# Patient Record
Sex: FEMALE | Race: WHITE | NOT HISPANIC OR LATINO | Employment: OTHER | URBAN - METROPOLITAN AREA
[De-identification: names, ages, dates, MRNs, and addresses within clinical notes are randomized per-mention and may not be internally consistent; named-entity substitution may affect disease eponyms.]

---

## 2017-02-02 ENCOUNTER — TELEPHONE (OUTPATIENT)
Dept: ENDOCRINOLOGY | Facility: CLINIC | Age: 62
End: 2017-02-02

## 2017-02-02 NOTE — TELEPHONE ENCOUNTER
----- Message from Isabella Collins sent at 2/2/2017  3:38 PM CST -----  Contact: Self [phone]  pls contact pt with lab results from December. Pt can reached at [phone].

## 2017-02-09 ENCOUNTER — TELEPHONE (OUTPATIENT)
Dept: ENDOCRINOLOGY | Facility: CLINIC | Age: 62
End: 2017-02-09

## 2017-02-09 NOTE — TELEPHONE ENCOUNTER
----- Message from Ayaka Raymond sent at 2/9/2017  8:36 AM CST -----  Contact: Patient  Patient is requesting a call back to discuss results completed 12/2016.    Please call patient at [phone].

## 2017-02-10 NOTE — TELEPHONE ENCOUNTER
I initially spoke with the patient in 12/2016 to discuss lab results with her. She could not remember our conversation. We discussed lab results today. Thyroid function tests were all within the normal limits. I also notified the MRI department to discuss MRI of brain from 11/2016. Radiologist stated that results were normal and that she didn't see a pituitary microadenoma. Patient also notified of this. She is informed to follow up with PCP for follow up. Patient verbalized understanding of all the above information.

## 2017-02-21 PROBLEM — S91.101A OPEN WOUND OF RIGHT GREAT TOE WITHOUT DAMAGE TO NAIL: Status: ACTIVE | Noted: 2017-02-21

## 2017-02-21 PROBLEM — S91.102A OPEN WOUND OF LEFT GREAT TOE WITHOUT DAMAGE TO NAIL: Status: ACTIVE | Noted: 2017-02-21

## 2017-03-07 PROBLEM — Z86.010 PERSONAL HISTORY OF COLONIC POLYPS: Status: ACTIVE | Noted: 2017-03-07

## 2017-11-02 ENCOUNTER — TELEPHONE (OUTPATIENT)
Dept: NEUROLOGY | Facility: CLINIC | Age: 62
End: 2017-11-02
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
## 2017-11-02 NOTE — TELEPHONE ENCOUNTER
----- Message from Awa Snider sent at 11/2/2017  9:20 AM CDT -----  Contact: self  Patient called regarding scheduling an appt from referral to Dr. Mcneil. Left message yesterday, no callback. Please contact [phone] (home)

## 2017-11-02 NOTE — TELEPHONE ENCOUNTER
Spoke with patient. Informed her that we did not have any available appointments until January; gave her main campus' number to try to get something sooner there.
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
## 2017-11-08 ENCOUNTER — TELEPHONE (OUTPATIENT)
Dept: NEUROLOGY | Facility: CLINIC | Age: 62
End: 2017-11-08

## 2020-12-28 PROBLEM — Z86.010 PERSONAL HISTORY OF COLONIC POLYPS: Status: RESOLVED | Noted: 2017-03-07 | Resolved: 2020-12-28

## 2021-06-09 PROBLEM — S91.101A OPEN WOUND OF RIGHT GREAT TOE WITHOUT DAMAGE TO NAIL: Status: RESOLVED | Noted: 2017-02-21 | Resolved: 2021-06-09

## 2021-06-09 PROBLEM — S91.102A OPEN WOUND OF LEFT GREAT TOE WITHOUT DAMAGE TO NAIL: Status: RESOLVED | Noted: 2017-02-21 | Resolved: 2021-06-09

## 2022-03-02 PROBLEM — G47.33 OSA ON CPAP: Status: ACTIVE | Noted: 2022-03-02

## 2022-10-31 PROBLEM — E66.09 CLASS 1 OBESITY DUE TO EXCESS CALORIES WITHOUT SERIOUS COMORBIDITY IN ADULT: Status: ACTIVE | Noted: 2022-10-31

## 2022-10-31 PROBLEM — Z86.010 HISTORY OF ADENOMATOUS POLYP OF COLON: Status: ACTIVE | Noted: 2022-10-31
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
## 2023-04-27 PROBLEM — S82.892A CLOSED FRACTURE OF LEFT ANKLE: Status: ACTIVE | Noted: 2023-04-27

## 2023-05-04 PROBLEM — F32.A DEPRESSION, UNSPECIFIED: Status: ACTIVE | Noted: 2023-05-04

## 2023-05-12 ENCOUNTER — TELEPHONE (OUTPATIENT)
Dept: PSYCHIATRY | Facility: CLINIC | Age: 68
End: 2023-05-12
Payer: MEDICARE

## 2023-05-12 NOTE — TELEPHONE ENCOUNTER
Called patient to see if she would like to be added to our wait list for med man.   Based off referral we received for PCP   Patient states that she is already seeing a psych doctor for medication management and has asked that the referral be cancelled.

## 2023-05-26 PROBLEM — I70.0 AORTIC ATHEROSCLEROSIS: Status: ACTIVE | Noted: 2023-05-26

## 2023-05-26 PROBLEM — F13.20 SEDATIVE, HYPNOTIC OR ANXIOLYTIC DEPENDENCE, UNCOMPLICATED: Status: ACTIVE | Noted: 2023-05-26

## 2023-06-09 PROBLEM — R55 CARDIAC SYNCOPE: Status: ACTIVE | Noted: 2023-06-09

## 2023-10-12 PROBLEM — Z87.81 HISTORY OF FRACTURE OF LEFT ANKLE: Status: ACTIVE | Noted: 2023-10-12
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
## 2023-12-03 PROBLEM — I65.21 STENOSIS OF RIGHT CAROTID ARTERY: Status: ACTIVE | Noted: 2023-12-03
